# Patient Record
Sex: MALE | Race: WHITE | NOT HISPANIC OR LATINO
[De-identification: names, ages, dates, MRNs, and addresses within clinical notes are randomized per-mention and may not be internally consistent; named-entity substitution may affect disease eponyms.]

---

## 2018-03-27 PROBLEM — Z00.00 ENCOUNTER FOR PREVENTIVE HEALTH EXAMINATION: Status: ACTIVE | Noted: 2018-03-27

## 2018-04-02 ENCOUNTER — APPOINTMENT (OUTPATIENT)
Dept: CARDIOLOGY | Facility: CLINIC | Age: 58
End: 2018-04-02
Payer: COMMERCIAL

## 2018-04-02 ENCOUNTER — NON-APPOINTMENT (OUTPATIENT)
Age: 58
End: 2018-04-02

## 2018-04-02 VITALS
BODY MASS INDEX: 29.33 KG/M2 | OXYGEN SATURATION: 98 % | HEART RATE: 76 BPM | WEIGHT: 198 LBS | DIASTOLIC BLOOD PRESSURE: 80 MMHG | HEIGHT: 69 IN | SYSTOLIC BLOOD PRESSURE: 122 MMHG

## 2018-04-02 DIAGNOSIS — Z78.9 OTHER SPECIFIED HEALTH STATUS: ICD-10-CM

## 2018-04-02 DIAGNOSIS — Z86.39 PERSONAL HISTORY OF OTHER ENDOCRINE, NUTRITIONAL AND METABOLIC DISEASE: ICD-10-CM

## 2018-04-02 PROCEDURE — 99204 OFFICE O/P NEW MOD 45 MIN: CPT

## 2018-04-02 PROCEDURE — 93000 ELECTROCARDIOGRAM COMPLETE: CPT

## 2018-04-02 RX ORDER — MULTIVITAMIN
TABLET ORAL DAILY
Refills: 0 | Status: ACTIVE | COMMUNITY

## 2018-04-02 RX ORDER — ASPIRIN 81 MG/1
81 TABLET, CHEWABLE ORAL DAILY
Refills: 0 | Status: ACTIVE | COMMUNITY

## 2018-04-02 RX ORDER — ROSUVASTATIN CALCIUM 10 MG/1
10 TABLET, FILM COATED ORAL
Refills: 0 | Status: ACTIVE | COMMUNITY

## 2018-05-02 ENCOUNTER — APPOINTMENT (OUTPATIENT)
Dept: CARDIOLOGY | Facility: CLINIC | Age: 58
End: 2018-05-02
Payer: COMMERCIAL

## 2018-05-02 PROCEDURE — A9502: CPT

## 2018-05-02 PROCEDURE — 93015 CV STRESS TEST SUPVJ I&R: CPT

## 2018-05-02 PROCEDURE — 78452 HT MUSCLE IMAGE SPECT MULT: CPT

## 2018-05-02 PROCEDURE — 93306 TTE W/DOPPLER COMPLETE: CPT

## 2018-05-11 ENCOUNTER — APPOINTMENT (OUTPATIENT)
Dept: CARDIOLOGY | Facility: CLINIC | Age: 58
End: 2018-05-11
Payer: COMMERCIAL

## 2018-05-11 VITALS
HEART RATE: 68 BPM | WEIGHT: 203 LBS | BODY MASS INDEX: 30.07 KG/M2 | SYSTOLIC BLOOD PRESSURE: 118 MMHG | HEIGHT: 69 IN | DIASTOLIC BLOOD PRESSURE: 64 MMHG

## 2018-05-11 PROCEDURE — 99214 OFFICE O/P EST MOD 30 MIN: CPT

## 2019-05-03 ENCOUNTER — RECORD ABSTRACTING (OUTPATIENT)
Age: 59
End: 2019-05-03

## 2019-05-30 ENCOUNTER — RECORD ABSTRACTING (OUTPATIENT)
Age: 59
End: 2019-05-30

## 2019-05-30 ENCOUNTER — NON-APPOINTMENT (OUTPATIENT)
Age: 59
End: 2019-05-30

## 2019-05-30 ENCOUNTER — APPOINTMENT (OUTPATIENT)
Dept: CARDIOLOGY | Facility: CLINIC | Age: 59
End: 2019-05-30
Payer: COMMERCIAL

## 2019-05-30 VITALS
SYSTOLIC BLOOD PRESSURE: 120 MMHG | DIASTOLIC BLOOD PRESSURE: 70 MMHG | HEART RATE: 53 BPM | OXYGEN SATURATION: 98 % | HEIGHT: 69 IN | BODY MASS INDEX: 28.73 KG/M2 | WEIGHT: 194 LBS

## 2019-05-30 DIAGNOSIS — I25.10 ATHEROSCLEROTIC HEART DISEASE OF NATIVE CORONARY ARTERY W/OUT ANGINA PECTORIS: ICD-10-CM

## 2019-05-30 DIAGNOSIS — Z86.39 PERSONAL HISTORY OF OTHER ENDOCRINE, NUTRITIONAL AND METABOLIC DISEASE: ICD-10-CM

## 2019-05-30 PROCEDURE — 93000 ELECTROCARDIOGRAM COMPLETE: CPT

## 2019-05-30 PROCEDURE — 99214 OFFICE O/P EST MOD 30 MIN: CPT

## 2019-05-30 NOTE — HISTORY OF PRESENT ILLNESS
[FreeTextEntry1] : Patient is presenting here today for cardiac follow up. Patient has a history of family history of CAD. Patient has a history of hyperlipidemia. Patient referred for calcium score. The CAT scan showed high calcium score suggestive of obstructive CAD. Patient is physically active. Patient exercises every day. Patient denies any chest pains or shortness of breath. No palpitations or syncope.

## 2019-05-30 NOTE — ASSESSMENT
[FreeTextEntry1] : High calcium score. Patient has a history of hyperlipidemia and family history of CAD.\par Patient had a nuclear stress test and echocardiogram done last year which revealed no evidence of any ischemia. There is no evidence of any structural heart disease. The patient is physically very active. He presents 3 times a week without any symptoms. Patient is on statin and aspirin. Continue primary prevention. Cardiac followup yearly and as needed.

## 2019-05-30 NOTE — PHYSICAL EXAM
[General Appearance - Well Developed] : well developed [Normal Appearance] : normal appearance [Well Groomed] : well groomed [General Appearance - Well Nourished] : well nourished [No Deformities] : no deformities [General Appearance - In No Acute Distress] : no acute distress [Normal Conjunctiva] : the conjunctiva exhibited no abnormalities [Eyelids - No Xanthelasma] : the eyelids demonstrated no xanthelasmas [Normal Oral Mucosa] : normal oral mucosa [No Oral Pallor] : no oral pallor [No Oral Cyanosis] : no oral cyanosis [Normal Jugular Venous A Waves Present] : normal jugular venous A waves present [Normal Jugular Venous V Waves Present] : normal jugular venous V waves present [No Jugular Venous Alexander A Waves] : no jugular venous alexander A waves [Respiration, Rhythm And Depth] : normal respiratory rhythm and effort [Exaggerated Use Of Accessory Muscles For Inspiration] : no accessory muscle use [Auscultation Breath Sounds / Voice Sounds] : lungs were clear to auscultation bilaterally [Heart Rate And Rhythm] : heart rate and rhythm were normal [Heart Sounds] : normal S1 and S2 [Abdomen Soft] : soft [Abdomen Tenderness] : non-tender [Abdomen Mass (___ Cm)] : no abdominal mass palpated [Abnormal Walk] : normal gait [Gait - Sufficient For Exercise Testing] : the gait was sufficient for exercise testing [Nail Clubbing] : no clubbing of the fingernails [Cyanosis, Localized] : no localized cyanosis [Petechial Hemorrhages (___cm)] : no petechial hemorrhages [Skin Color & Pigmentation] : normal skin color and pigmentation [] : no rash [No Venous Stasis] : no venous stasis [Skin Lesions] : no skin lesions [No Skin Ulcers] : no skin ulcer [No Xanthoma] : no  xanthoma was observed [Oriented To Time, Place, And Person] : oriented to person, place, and time [Affect] : the affect was normal [Mood] : the mood was normal [No Anxiety] : not feeling anxious [FreeTextEntry1] : SM

## 2020-05-21 ENCOUNTER — APPOINTMENT (OUTPATIENT)
Dept: CARDIOLOGY | Facility: CLINIC | Age: 60
End: 2020-05-21

## 2021-04-04 ENCOUNTER — APPOINTMENT (OUTPATIENT)
Dept: DISASTER EMERGENCY | Facility: CLINIC | Age: 61
End: 2021-04-04

## 2021-04-04 DIAGNOSIS — Z01.818 ENCOUNTER FOR OTHER PREPROCEDURAL EXAMINATION: ICD-10-CM

## 2021-04-05 LAB — SARS-COV-2 N GENE NPH QL NAA+PROBE: NOT DETECTED

## 2022-03-21 ENCOUNTER — APPOINTMENT (OUTPATIENT)
Dept: ULTRASOUND IMAGING | Facility: CLINIC | Age: 62
End: 2022-03-21
Payer: COMMERCIAL

## 2022-03-21 PROCEDURE — 76700 US EXAM ABDOM COMPLETE: CPT

## 2023-01-16 ENCOUNTER — OFFICE (OUTPATIENT)
Dept: URBAN - METROPOLITAN AREA CLINIC 103 | Facility: CLINIC | Age: 63
Setting detail: OPHTHALMOLOGY
End: 2023-01-16
Payer: MEDICARE

## 2023-01-16 DIAGNOSIS — H40.041: ICD-10-CM

## 2023-01-16 DIAGNOSIS — H40.1131: ICD-10-CM

## 2023-01-16 DIAGNOSIS — H26.493: ICD-10-CM

## 2023-01-16 PROBLEM — H26.491 POSTERIOR CAPSULE OPACITY; RIGHT EYE, LEFT EYE, BOTH EYES: Status: ACTIVE | Noted: 2023-01-16

## 2023-01-16 PROBLEM — H26.492 POSTERIOR CAPSULE OPACITY; RIGHT EYE, LEFT EYE, BOTH EYES: Status: ACTIVE | Noted: 2023-01-16

## 2023-01-16 PROCEDURE — 99214 OFFICE O/P EST MOD 30 MIN: CPT | Performed by: OPHTHALMOLOGY

## 2023-01-16 PROCEDURE — 92250 FUNDUS PHOTOGRAPHY W/I&R: CPT | Performed by: OPHTHALMOLOGY

## 2023-01-16 ASSESSMENT — KERATOMETRY
OS_K1POWER_DIOPTERS: 44.00
OD_K1POWER_DIOPTERS: 44.50
METHOD_AUTO_MANUAL: AUTO
OD_K2POWER_DIOPTERS: 44.50
OS_K2POWER_DIOPTERS: 45.00
OS_AXISANGLE_DEGREES: 044

## 2023-01-16 ASSESSMENT — REFRACTION_MANIFEST
OS_VA1: 20/60
OD_AXIS: 085
OD_SPHERE: +1.00
OS_VA2: 20/20(J1+)
OD_VA1: 20/40
OD_CYLINDER: -0.50
OD_VA1: 20/40
OS_CYLINDER: -1.75
OS_VA1: 20/30+2
OD_SPHERE: -6.25
OS_SPHERE: PLANO
OD_ADD: +2.50
OS_ADD: +2.50
OD_CYLINDER: -0.75
OS_SPHERE: -1.00
OD_VA2: 20/20(J1+)
OS_AXIS: 120
OD_AXIS: 075
OS_AXIS: 122
OS_CYLINDER: -1.00

## 2023-01-16 ASSESSMENT — REFRACTION_AUTOREFRACTION
OD_CYLINDER: -0.50
OS_AXIS: 122
OS_SPHERE: -1.00
OD_AXIS: 085
OS_CYLINDER: -1.00
OD_SPHERE: +1.00

## 2023-01-16 ASSESSMENT — PACHYMETRY
OD_CT_CORRECTION: 2
OS_CT_CORRECTION: 1
OS_CT_UM: 526
OD_CT_UM: 517

## 2023-01-16 ASSESSMENT — AXIALLENGTH_DERIVED
OS_AL: 23.811
OD_AL: 22.9503
OD_AL: 26.035
OD_AL: 22.9503
OS_AL: 23.811

## 2023-01-16 ASSESSMENT — SPHEQUIV_DERIVED
OD_SPHEQUIV: 0.75
OD_SPHEQUIV: -6.625
OD_SPHEQUIV: 0.75
OS_SPHEQUIV: -1.5
OS_SPHEQUIV: -1.5

## 2023-01-16 ASSESSMENT — REFRACTION_CURRENTRX
OD_OVR_VA: 20/
OD_SPHERE: +2.50
OS_SPHERE: +2.50
OS_OVR_VA: 20/

## 2023-01-16 ASSESSMENT — CONFRONTATIONAL VISUAL FIELD TEST (CVF)
OS_FINDINGS: FULL
OD_FINDINGS: FULL

## 2023-01-16 ASSESSMENT — VISUAL ACUITY
OD_BCVA: 20/60
OS_BCVA: 20/40

## 2023-01-16 ASSESSMENT — TONOMETRY
OS_IOP_MMHG: 18
OD_IOP_MMHG: 14

## 2023-03-15 ENCOUNTER — ASC (OUTPATIENT)
Dept: URBAN - METROPOLITAN AREA SURGERY 8 | Facility: SURGERY | Age: 63
Setting detail: OPHTHALMOLOGY
End: 2023-03-15
Payer: MEDICARE

## 2023-03-15 DIAGNOSIS — H26.492: ICD-10-CM

## 2023-03-15 PROCEDURE — 66821 AFTER CATARACT LASER SURGERY: CPT | Performed by: OPHTHALMOLOGY

## 2023-03-15 ASSESSMENT — SPHEQUIV_DERIVED
OD_SPHEQUIV: 0.75
OS_SPHEQUIV: -1.5
OS_SPHEQUIV: -1.5
OD_SPHEQUIV: 0.75
OD_SPHEQUIV: -6.625

## 2023-03-15 ASSESSMENT — REFRACTION_CURRENTRX
OD_OVR_VA: 20/
OS_OVR_VA: 20/
OS_SPHERE: +2.50
OD_SPHERE: +2.50

## 2023-03-15 ASSESSMENT — AXIALLENGTH_DERIVED
OD_AL: 22.9503
OD_AL: 22.9503
OS_AL: 23.811
OS_AL: 23.811
OD_AL: 26.035

## 2023-03-15 ASSESSMENT — REFRACTION_MANIFEST
OD_CYLINDER: -0.75
OS_SPHERE: -1.00
OS_VA1: 20/30+2
OS_CYLINDER: -1.00
OS_VA1: 20/60
OD_ADD: +2.50
OS_AXIS: 122
OD_VA1: 20/40
OD_CYLINDER: -0.50
OS_VA2: 20/20(J1+)
OD_VA1: 20/40
OD_AXIS: 075
OD_AXIS: 085
OS_CYLINDER: -1.75
OS_ADD: +2.50
OS_AXIS: 120
OD_SPHERE: -6.25
OD_VA2: 20/20(J1+)
OS_SPHERE: PLANO
OD_SPHERE: +1.00

## 2023-03-15 ASSESSMENT — REFRACTION_AUTOREFRACTION
OS_CYLINDER: -1.00
OS_AXIS: 122
OD_CYLINDER: -0.50
OS_SPHERE: -1.00
OD_AXIS: 085
OD_SPHERE: +1.00

## 2023-03-15 ASSESSMENT — KERATOMETRY
OS_AXISANGLE_DEGREES: 044
METHOD_AUTO_MANUAL: AUTO
OD_K2POWER_DIOPTERS: 44.50
OS_K2POWER_DIOPTERS: 45.00
OD_K1POWER_DIOPTERS: 44.50
OS_K1POWER_DIOPTERS: 44.00

## 2023-03-15 ASSESSMENT — VISUAL ACUITY
OS_BCVA: 20/40
OD_BCVA: 20/60

## 2023-03-17 ENCOUNTER — ASC (OUTPATIENT)
Dept: URBAN - METROPOLITAN AREA SURGERY 8 | Facility: SURGERY | Age: 63
Setting detail: OPHTHALMOLOGY
End: 2023-03-17
Payer: MEDICARE

## 2023-03-17 DIAGNOSIS — H26.491: ICD-10-CM

## 2023-03-17 PROBLEM — H26.492 POSTERIOR CAPSULE OPACITY; RIGHT EYE, LEFT EYE: Status: ACTIVE | Noted: 2023-03-17

## 2023-03-17 PROCEDURE — 66821 AFTER CATARACT LASER SURGERY: CPT | Performed by: OPHTHALMOLOGY

## 2023-03-17 ASSESSMENT — REFRACTION_AUTOREFRACTION
OS_AXIS: 122
OD_SPHERE: +1.00
OD_CYLINDER: -0.50
OS_CYLINDER: -1.00
OS_SPHERE: -1.00
OD_AXIS: 085

## 2023-03-17 ASSESSMENT — AXIALLENGTH_DERIVED
OD_AL: 26.035
OD_AL: 22.9503
OS_AL: 23.811
OS_AL: 23.811
OD_AL: 22.9503

## 2023-03-17 ASSESSMENT — SPHEQUIV_DERIVED
OS_SPHEQUIV: -1.5
OD_SPHEQUIV: -6.625
OD_SPHEQUIV: 0.75
OD_SPHEQUIV: 0.75
OS_SPHEQUIV: -1.5

## 2023-03-17 ASSESSMENT — REFRACTION_MANIFEST
OD_SPHERE: -6.25
OS_AXIS: 122
OS_VA1: 20/60
OD_SPHERE: +1.00
OS_CYLINDER: -1.00
OS_SPHERE: -1.00
OD_VA1: 20/40
OS_VA1: 20/30+2
OD_VA2: 20/20(J1+)
OD_AXIS: 085
OS_VA2: 20/20(J1+)
OD_CYLINDER: -0.50
OS_SPHERE: PLANO
OD_CYLINDER: -0.75
OS_CYLINDER: -1.75
OD_ADD: +2.50
OS_ADD: +2.50
OS_AXIS: 120
OD_AXIS: 075
OD_VA1: 20/40

## 2023-03-17 ASSESSMENT — REFRACTION_CURRENTRX
OS_OVR_VA: 20/
OD_SPHERE: +2.50
OS_SPHERE: +2.50
OD_OVR_VA: 20/

## 2023-03-17 ASSESSMENT — KERATOMETRY
OD_K1POWER_DIOPTERS: 44.50
OS_K2POWER_DIOPTERS: 45.00
OS_K1POWER_DIOPTERS: 44.00
OS_AXISANGLE_DEGREES: 044
OD_K2POWER_DIOPTERS: 44.50
METHOD_AUTO_MANUAL: AUTO

## 2023-03-17 ASSESSMENT — VISUAL ACUITY
OD_BCVA: 20/60
OS_BCVA: 20/40

## 2023-04-24 ENCOUNTER — OFFICE (OUTPATIENT)
Dept: URBAN - METROPOLITAN AREA CLINIC 103 | Facility: CLINIC | Age: 63
Setting detail: OPHTHALMOLOGY
End: 2023-04-24
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
## 2023-04-24 PROCEDURE — NO SHOW FE NO SHOW FEE: Performed by: OPHTHALMOLOGY

## 2023-05-11 ENCOUNTER — RX ONLY (RX ONLY)
Age: 63
End: 2023-05-11

## 2023-05-11 ENCOUNTER — OFFICE (OUTPATIENT)
Dept: URBAN - METROPOLITAN AREA CLINIC 113 | Facility: CLINIC | Age: 63
Setting detail: OPHTHALMOLOGY
End: 2023-05-11
Payer: MEDICARE

## 2023-05-11 DIAGNOSIS — H40.1131: ICD-10-CM

## 2023-05-11 DIAGNOSIS — H40.041: ICD-10-CM

## 2023-05-11 DIAGNOSIS — Z96.1: ICD-10-CM

## 2023-05-11 PROCEDURE — 99024 POSTOP FOLLOW-UP VISIT: CPT | Performed by: OPHTHALMOLOGY

## 2023-05-11 ASSESSMENT — AXIALLENGTH_DERIVED
OS_AL: 23.7643
OD_AL: 26.091
OD_AL: 22.9939
OS_AL: 23.0455
OD_AL: 23.0868

## 2023-05-11 ASSESSMENT — REFRACTION_AUTOREFRACTION
OD_AXIS: 079
OS_SPHERE: +1.00
OS_CYLINDER: -1.25
OD_SPHERE: +0.75
OS_AXIS: 120
OD_CYLINDER: -0.50

## 2023-05-11 ASSESSMENT — REFRACTION_MANIFEST
OD_SPHERE: +1.00
OD_SPHERE: -6.25
OS_ADD: +2.50
OD_CYLINDER: -0.75
OS_CYLINDER: -1.75
OS_VA1: 20/30+2
OD_CYLINDER: -0.50
OD_AXIS: 075
OS_VA2: 20/20(J1+)
OS_VA1: 20/60
OS_AXIS: 122
OS_CYLINDER: -1.00
OS_SPHERE: PLANO
OD_VA2: 20/20(J1+)
OD_VA1: 20/40
OD_AXIS: 085
OD_ADD: +2.50
OD_VA1: 20/40
OS_SPHERE: -1.00
OS_AXIS: 120

## 2023-05-11 ASSESSMENT — REFRACTION_CURRENTRX
OD_OVR_VA: 20/
OD_SPHERE: +2.50
OS_OVR_VA: 20/
OS_SPHERE: +2.50

## 2023-05-11 ASSESSMENT — PACHYMETRY
OD_CT_UM: 517
OS_CT_CORRECTION: 1
OD_CT_CORRECTION: 2
OS_CT_UM: 526

## 2023-05-11 ASSESSMENT — SPHEQUIV_DERIVED
OS_SPHEQUIV: -1.5
OD_SPHEQUIV: 0.5
OD_SPHEQUIV: 0.75
OD_SPHEQUIV: -6.625
OS_SPHEQUIV: 0.375

## 2023-05-11 ASSESSMENT — VISUAL ACUITY
OD_BCVA: 20/30
OS_BCVA: 20/30+1

## 2023-05-11 ASSESSMENT — KERATOMETRY
METHOD_AUTO_MANUAL: AUTO
OS_K1POWER_DIOPTERS: 44.25
OD_K2POWER_DIOPTERS: 44.50
OS_AXISANGLE_DEGREES: 037
OD_K1POWER_DIOPTERS: 44.25
OD_AXISANGLE_DEGREES: 133
OS_K2POWER_DIOPTERS: 45.00

## 2023-05-11 ASSESSMENT — CONFRONTATIONAL VISUAL FIELD TEST (CVF)
OD_FINDINGS: FULL
OS_FINDINGS: FULL

## 2023-05-11 ASSESSMENT — TONOMETRY: OD_IOP_MMHG: 20

## 2023-06-08 ENCOUNTER — OFFICE (OUTPATIENT)
Dept: URBAN - METROPOLITAN AREA CLINIC 113 | Facility: CLINIC | Age: 63
Setting detail: OPHTHALMOLOGY
End: 2023-06-08
Payer: MEDICARE

## 2023-06-08 DIAGNOSIS — H40.1131: ICD-10-CM

## 2023-06-08 DIAGNOSIS — H40.041: ICD-10-CM

## 2023-06-08 DIAGNOSIS — Z96.1: ICD-10-CM

## 2023-06-08 PROCEDURE — 92012 INTRM OPH EXAM EST PATIENT: CPT | Performed by: OPHTHALMOLOGY

## 2023-06-08 ASSESSMENT — REFRACTION_MANIFEST
OD_AXIS: 075
OD_VA2: 20/20(J1+)
OS_ADD: +2.50
OS_AXIS: 122
OS_SPHERE: PLANO
OD_CYLINDER: -0.75
OD_CYLINDER: -0.50
OS_CYLINDER: -1.00
OS_AXIS: 120
OS_VA1: 20/30+2
OS_CYLINDER: -1.75
OD_SPHERE: -6.25
OS_VA1: 20/60
OD_VA1: 20/40
OD_ADD: +2.50
OD_VA1: 20/40
OS_SPHERE: -1.00
OD_AXIS: 085
OD_SPHERE: +1.00
OS_VA2: 20/20(J1+)

## 2023-06-08 ASSESSMENT — TONOMETRY
OS_IOP_MMHG: 18
OD_IOP_MMHG: 11

## 2023-06-08 ASSESSMENT — AXIALLENGTH_DERIVED
OD_AL: 23.0894
OD_AL: 22.9503
OS_AL: 35.13
OD_AL: 26.035
OS_AL: 35.02

## 2023-06-08 ASSESSMENT — VISUAL ACUITY
OS_BCVA: 20/30+1
OD_BCVA: 20/30

## 2023-06-08 ASSESSMENT — PACHYMETRY
OD_CT_UM: 517
OD_CT_CORRECTION: 2
OS_CT_UM: 526
OS_CT_CORRECTION: 1

## 2023-06-08 ASSESSMENT — REFRACTION_AUTOREFRACTION
OS_SPHERE: -1.00
OD_CYLINDER: -0.25
OD_AXIS: 060
OS_AXIS: 121
OD_SPHERE: +0.50
OS_CYLINDER: -1.25

## 2023-06-08 ASSESSMENT — KERATOMETRY
OS_AXISANGLE_DEGREES: 036
OS_K2POWER_DIOPTERS: 4.00
OD_AXISANGLE_DEGREES: 090
OD_K2POWER_DIOPTERS: 44.50
METHOD_AUTO_MANUAL: AUTO
OD_K1POWER_DIOPTERS: 44.50
OS_K1POWER_DIOPTERS: 44.25

## 2023-06-08 ASSESSMENT — CONFRONTATIONAL VISUAL FIELD TEST (CVF)
OD_FINDINGS: FULL
OS_FINDINGS: FULL

## 2023-06-08 ASSESSMENT — SPHEQUIV_DERIVED
OD_SPHEQUIV: 0.75
OD_SPHEQUIV: 0.375
OD_SPHEQUIV: -6.625
OS_SPHEQUIV: -1.5
OS_SPHEQUIV: -1.625

## 2023-06-08 ASSESSMENT — REFRACTION_CURRENTRX
OS_SPHERE: +2.50
OD_OVR_VA: 20/
OD_SPHERE: +2.50
OS_OVR_VA: 20/

## 2023-07-13 ENCOUNTER — OFFICE (OUTPATIENT)
Dept: URBAN - METROPOLITAN AREA CLINIC 113 | Facility: CLINIC | Age: 63
Setting detail: OPHTHALMOLOGY
End: 2023-07-13
Payer: MEDICARE

## 2023-07-13 DIAGNOSIS — H40.1131: ICD-10-CM

## 2023-07-13 DIAGNOSIS — H40.041: ICD-10-CM

## 2023-07-13 DIAGNOSIS — Z96.1: ICD-10-CM

## 2023-07-13 PROCEDURE — 99213 OFFICE O/P EST LOW 20 MIN: CPT | Performed by: OPHTHALMOLOGY

## 2023-07-13 ASSESSMENT — KERATOMETRY
OS_K2POWER_DIOPTERS: 45.00
OS_K1POWER_DIOPTERS: 44.25
OS_AXISANGLE_DEGREES: 039
METHOD_AUTO_MANUAL: AUTO
OD_K1POWER_DIOPTERS: 44.25
OD_AXISANGLE_DEGREES: 090
OD_K2POWER_DIOPTERS: 44.25

## 2023-07-13 ASSESSMENT — CONFRONTATIONAL VISUAL FIELD TEST (CVF)
OS_FINDINGS: FULL
OD_FINDINGS: FULL

## 2023-07-13 ASSESSMENT — REFRACTION_MANIFEST
OS_SPHERE: PLANO
OS_AXIS: 120
OS_ADD: +2.50
OD_SPHERE: +1.00
OS_CYLINDER: -1.75
OD_VA1: 20/40
OS_VA2: 20/20(J1+)
OD_AXIS: 075
OD_AXIS: 085
OS_SPHERE: -1.00
OD_ADD: +2.50
OS_VA1: 20/60
OS_VA1: 20/30+2
OD_CYLINDER: -0.75
OS_CYLINDER: -1.00
OD_VA1: 20/40
OD_VA2: 20/20(J1+)
OS_AXIS: 122
OD_CYLINDER: -0.50
OD_SPHERE: -6.25

## 2023-07-13 ASSESSMENT — REFRACTION_CURRENTRX
OS_OVR_VA: 20/
OD_OVR_VA: 20/
OS_SPHERE: +2.50
OD_SPHERE: +2.50

## 2023-07-13 ASSESSMENT — AXIALLENGTH_DERIVED
OD_AL: 26.1473
OS_AL: 23.7643
OD_AL: 23.0841
OS_AL: 23.8139
OD_AL: 23.0376

## 2023-07-13 ASSESSMENT — SPHEQUIV_DERIVED
OD_SPHEQUIV: 0.75
OS_SPHEQUIV: -1.625
OS_SPHEQUIV: -1.5
OD_SPHEQUIV: 0.625
OD_SPHEQUIV: -6.625

## 2023-07-13 ASSESSMENT — REFRACTION_AUTOREFRACTION
OS_AXIS: 115
OD_SPHERE: +0.75
OD_CYLINDER: -0.25
OD_AXIS: 082
OS_CYLINDER: -1.25
OS_SPHERE: -1.00

## 2023-07-13 ASSESSMENT — PACHYMETRY
OD_CT_UM: 517
OD_CT_CORRECTION: 2
OS_CT_CORRECTION: 1
OS_CT_UM: 526

## 2023-07-13 ASSESSMENT — VISUAL ACUITY
OD_BCVA: 20/30-1
OS_BCVA: 20/20-1

## 2023-07-13 ASSESSMENT — TONOMETRY
OS_IOP_MMHG: 15
OS_IOP_MMHG: 15
OD_IOP_MMHG: 15

## 2023-11-27 ENCOUNTER — OFFICE (OUTPATIENT)
Dept: URBAN - METROPOLITAN AREA CLINIC 103 | Facility: CLINIC | Age: 63
Setting detail: OPHTHALMOLOGY
End: 2023-11-27
Payer: MEDICARE

## 2023-11-27 DIAGNOSIS — H40.1131: ICD-10-CM

## 2023-11-27 PROCEDURE — 92020 GONIOSCOPY: CPT | Performed by: OPHTHALMOLOGY

## 2023-11-27 PROCEDURE — 92014 COMPRE OPH EXAM EST PT 1/>: CPT | Performed by: OPHTHALMOLOGY

## 2023-11-27 PROCEDURE — 92083 EXTENDED VISUAL FIELD XM: CPT | Performed by: OPHTHALMOLOGY

## 2023-11-27 PROCEDURE — 92133 CPTRZD OPH DX IMG PST SGM ON: CPT | Performed by: OPHTHALMOLOGY

## 2023-11-27 ASSESSMENT — REFRACTION_MANIFEST
OS_SPHERE: -1.00
OS_ADD: +2.50
OS_AXIS: 120
OD_AXIS: 075
OD_CYLINDER: -0.75
OS_VA1: 20/30+2
OD_VA1: 20/40
OS_CYLINDER: -1.00
OS_CYLINDER: -1.75
OS_AXIS: 122
OD_CYLINDER: -0.50
OS_VA1: 20/60
OD_VA1: 20/40
OS_SPHERE: PLANO
OD_SPHERE: -6.25
OD_ADD: +2.50
OS_VA2: 20/20(J1+)
OD_AXIS: 085
OD_SPHERE: +1.00
OD_VA2: 20/20(J1+)

## 2023-11-27 ASSESSMENT — REFRACTION_CURRENTRX
OS_SPHERE: +2.50
OD_OVR_VA: 20/
OD_SPHERE: +2.50
OS_OVR_VA: 20/

## 2023-11-27 ASSESSMENT — REFRACTION_AUTOREFRACTION
OS_SPHERE: -0.25
OD_SPHERE: +1.00
OD_CYLINDER: -0.75
OD_AXIS: 097
OS_AXIS: 117
OS_CYLINDER: -1.50

## 2023-11-27 ASSESSMENT — SPHEQUIV_DERIVED
OD_SPHEQUIV: 0.625
OD_SPHEQUIV: 0.75
OS_SPHEQUIV: -1.5
OD_SPHEQUIV: -6.625
OS_SPHEQUIV: -1

## 2023-11-27 ASSESSMENT — CONFRONTATIONAL VISUAL FIELD TEST (CVF)
OD_FINDINGS: FULL
OS_FINDINGS: FULL

## 2024-03-11 ENCOUNTER — OFFICE (OUTPATIENT)
Dept: URBAN - METROPOLITAN AREA CLINIC 103 | Facility: CLINIC | Age: 64
Setting detail: OPHTHALMOLOGY
End: 2024-03-11
Payer: MEDICARE

## 2024-03-11 ENCOUNTER — RX ONLY (RX ONLY)
Age: 64
End: 2024-03-11

## 2024-03-11 DIAGNOSIS — H40.1131: ICD-10-CM

## 2024-03-11 PROCEDURE — 92250 FUNDUS PHOTOGRAPHY W/I&R: CPT | Performed by: OPHTHALMOLOGY

## 2024-03-11 PROCEDURE — 92014 COMPRE OPH EXAM EST PT 1/>: CPT | Performed by: OPHTHALMOLOGY

## 2024-03-11 ASSESSMENT — REFRACTION_MANIFEST
OD_AXIS: 085
OD_CYLINDER: -0.75
OS_AXIS: 120
OS_VA1: 20/30+2
OS_CYLINDER: -1.75
OD_VA1: 20/40
OD_CYLINDER: -0.50
OD_ADD: +2.50
OD_SPHERE: -6.25
OS_SPHERE: -1.00
OD_VA1: 20/40
OS_AXIS: 122
OS_ADD: +2.50
OS_VA1: 20/60
OS_VA2: 20/20(J1+)
OD_VA2: 20/20(J1+)
OD_SPHERE: +1.00
OS_CYLINDER: -1.00
OS_SPHERE: PLANO
OD_AXIS: 075

## 2024-03-11 ASSESSMENT — REFRACTION_CURRENTRX
OD_SPHERE: +2.50
OS_OVR_VA: 20/
OS_SPHERE: +2.50
OD_OVR_VA: 20/

## 2024-03-11 ASSESSMENT — SPHEQUIV_DERIVED
OD_SPHEQUIV: 0.75
OD_SPHEQUIV: -6.625
OS_SPHEQUIV: -1.5

## 2024-04-08 ENCOUNTER — OFFICE (OUTPATIENT)
Dept: URBAN - METROPOLITAN AREA CLINIC 103 | Facility: CLINIC | Age: 64
Setting detail: OPHTHALMOLOGY
End: 2024-04-08
Payer: COMMERCIAL

## 2024-04-08 DIAGNOSIS — H40.1131: ICD-10-CM

## 2024-04-08 PROCEDURE — 99213 OFFICE O/P EST LOW 20 MIN: CPT | Performed by: OPHTHALMOLOGY

## 2024-07-22 ENCOUNTER — OFFICE (OUTPATIENT)
Dept: URBAN - METROPOLITAN AREA CLINIC 103 | Facility: CLINIC | Age: 64
Setting detail: OPHTHALMOLOGY
End: 2024-07-22
Payer: COMMERCIAL

## 2024-07-22 DIAGNOSIS — H40.041: ICD-10-CM

## 2024-07-22 DIAGNOSIS — Z96.1: ICD-10-CM

## 2024-07-22 DIAGNOSIS — H40.1131: ICD-10-CM

## 2024-07-22 PROCEDURE — 99213 OFFICE O/P EST LOW 20 MIN: CPT | Performed by: OPHTHALMOLOGY

## 2024-07-22 ASSESSMENT — CONFRONTATIONAL VISUAL FIELD TEST (CVF)
OD_FINDINGS: FULL
OS_FINDINGS: FULL

## 2024-12-16 ENCOUNTER — OFFICE (OUTPATIENT)
Dept: URBAN - METROPOLITAN AREA CLINIC 112 | Facility: CLINIC | Age: 64
Setting detail: OPHTHALMOLOGY
End: 2024-12-16
Payer: COMMERCIAL

## 2024-12-16 DIAGNOSIS — H40.041: ICD-10-CM

## 2024-12-16 DIAGNOSIS — H40.1131: ICD-10-CM

## 2024-12-16 DIAGNOSIS — Z96.1: ICD-10-CM

## 2024-12-16 PROCEDURE — 92012 INTRM OPH EXAM EST PATIENT: CPT | Performed by: OPHTHALMOLOGY

## 2024-12-16 PROCEDURE — 92133 CPTRZD OPH DX IMG PST SGM ON: CPT | Performed by: OPHTHALMOLOGY

## 2024-12-16 PROCEDURE — 92083 EXTENDED VISUAL FIELD XM: CPT | Performed by: OPHTHALMOLOGY

## 2024-12-16 ASSESSMENT — REFRACTION_MANIFEST
OD_VA1: 20/40
OS_SPHERE: -1.00
OS_VA2: 20/20(J1+)
OD_VA1: 20/40
OS_VA1: 20/60
OD_CYLINDER: -0.50
OD_AXIS: 085
OS_ADD: +2.50
OS_CYLINDER: -1.75
OS_SPHERE: PLANO
OD_SPHERE: -6.25
OD_VA2: 20/20(J1+)
OS_VA1: 20/30+2
OS_CYLINDER: -1.00
OD_AXIS: 075
OS_AXIS: 122
OD_ADD: +2.50
OS_AXIS: 120
OD_SPHERE: +1.00
OD_CYLINDER: -0.75

## 2024-12-16 ASSESSMENT — KERATOMETRY
OS_AXISANGLE_DEGREES: 040
OS_K2POWER_DIOPTERS: 45.25
OD_K2POWER_DIOPTERS: 44.50
OD_K1POWER_DIOPTERS: 44.00
OD_AXISANGLE_DEGREES: 147
METHOD_AUTO_MANUAL: AUTO
OS_K1POWER_DIOPTERS: 44.00

## 2024-12-16 ASSESSMENT — TONOMETRY
OD_IOP_MMHG: 12
OS_IOP_MMHG: 12

## 2024-12-16 ASSESSMENT — REFRACTION_AUTOREFRACTION
OS_SPHERE: -0.75
OS_CYLINDER: -1.25
OD_CYLINDER: -0.50
OS_AXIS: 123
OD_AXIS: 089
OD_SPHERE: +1.00

## 2024-12-16 ASSESSMENT — REFRACTION_CURRENTRX
OS_OVR_VA: 20/
OD_SPHERE: +2.50
OD_OVR_VA: 20/
OS_SPHERE: +2.50

## 2024-12-16 ASSESSMENT — PACHYMETRY
OD_CT_CORRECTION: 2
OS_CT_CORRECTION: 1
OS_CT_UM: 526
OD_CT_UM: 517

## 2024-12-16 ASSESSMENT — VISUAL ACUITY
OD_BCVA: 20/100
OS_BCVA: 20/30

## 2024-12-16 ASSESSMENT — CONFRONTATIONAL VISUAL FIELD TEST (CVF)
OD_FINDINGS: FULL
OS_FINDINGS: FULL

## 2025-03-10 ENCOUNTER — OFFICE (OUTPATIENT)
Dept: URBAN - METROPOLITAN AREA CLINIC 103 | Facility: CLINIC | Age: 65
Setting detail: OPHTHALMOLOGY
End: 2025-03-10
Payer: MEDICARE

## 2025-03-10 DIAGNOSIS — H40.1131: ICD-10-CM

## 2025-03-10 DIAGNOSIS — Z96.1: ICD-10-CM

## 2025-03-10 DIAGNOSIS — H40.041: ICD-10-CM

## 2025-03-10 PROCEDURE — 92012 INTRM OPH EXAM EST PATIENT: CPT | Performed by: OPHTHALMOLOGY

## 2025-03-10 PROCEDURE — 92250 FUNDUS PHOTOGRAPHY W/I&R: CPT | Performed by: OPHTHALMOLOGY

## 2025-03-10 ASSESSMENT — TONOMETRY
OS_IOP_MMHG: 10
OD_IOP_MMHG: 13

## 2025-03-10 ASSESSMENT — REFRACTION_MANIFEST
OS_CYLINDER: -1.75
OS_AXIS: 122
OD_CYLINDER: -0.50
OS_ADD: +2.50
OS_VA1: 20/30+2
OS_VA1: 20/60
OD_VA2: 20/20(J1+)
OD_VA1: 20/40
OS_SPHERE: -1.00
OD_AXIS: 085
OS_SPHERE: PLANO
OD_SPHERE: +1.00
OD_AXIS: 075
OD_CYLINDER: -0.75
OS_AXIS: 120
OS_CYLINDER: -1.00
OD_SPHERE: -6.25
OS_VA2: 20/20(J1+)
OD_ADD: +2.50
OD_VA1: 20/40

## 2025-03-10 ASSESSMENT — KERATOMETRY
OS_K2POWER_DIOPTERS: 45.00
OS_K1POWER_DIOPTERS: 44.00
METHOD_AUTO_MANUAL: AUTO
OS_AXISANGLE_DEGREES: 036
OD_K1POWER_DIOPTERS: 44.25
OD_AXISANGLE_DEGREES: 006
OD_K2POWER_DIOPTERS: 44.50

## 2025-03-10 ASSESSMENT — REFRACTION_AUTOREFRACTION
OD_SPHERE: +1.00
OS_CYLINDER: -1.25
OS_SPHERE: -0.50
OS_AXIS: 120
OD_AXIS: 097
OD_CYLINDER: -0.75

## 2025-03-10 ASSESSMENT — VISUAL ACUITY
OD_BCVA: 20/100+1
OS_BCVA: 20/30-1

## 2025-03-10 ASSESSMENT — REFRACTION_CURRENTRX
OD_SPHERE: +2.50
OS_SPHERE: +2.50
OD_OVR_VA: 20/
OS_OVR_VA: 20/

## 2025-03-10 ASSESSMENT — PACHYMETRY
OS_CT_UM: 526
OD_CT_CORRECTION: 2
OS_CT_CORRECTION: 1
OD_CT_UM: 517

## 2025-03-10 ASSESSMENT — CONFRONTATIONAL VISUAL FIELD TEST (CVF)
OD_FINDINGS: FULL
OS_FINDINGS: FULL

## 2025-07-14 ENCOUNTER — OFFICE (OUTPATIENT)
Dept: URBAN - METROPOLITAN AREA CLINIC 103 | Facility: CLINIC | Age: 65
Setting detail: OPHTHALMOLOGY
End: 2025-07-14
Payer: MEDICARE

## 2025-07-14 DIAGNOSIS — Z96.1: ICD-10-CM

## 2025-07-14 DIAGNOSIS — H40.1131: ICD-10-CM

## 2025-07-14 DIAGNOSIS — H40.041: ICD-10-CM

## 2025-07-14 PROCEDURE — 92012 INTRM OPH EXAM EST PATIENT: CPT | Performed by: OPHTHALMOLOGY

## 2025-07-14 ASSESSMENT — REFRACTION_MANIFEST
OS_SPHERE: PLANO
OS_VA1: 20/60
OD_CYLINDER: -0.75
OS_SPHERE: -1.00
OS_ADD: +2.50
OD_VA1: 20/40
OS_CYLINDER: -1.75
OS_VA2: 20/20(J1+)
OD_VA2: 20/20(J1+)
OD_ADD: +2.50
OD_CYLINDER: -0.50
OD_SPHERE: -6.25
OD_AXIS: 085
OS_AXIS: 120
OD_VA1: 20/40
OD_SPHERE: +1.00
OS_AXIS: 122
OS_VA1: 20/30+2
OS_CYLINDER: -1.00
OD_AXIS: 075

## 2025-07-14 ASSESSMENT — PACHYMETRY
OS_CT_CORRECTION: 1
OD_CT_CORRECTION: 2
OD_CT_UM: 517
OS_CT_UM: 526

## 2025-07-14 ASSESSMENT — REFRACTION_CURRENTRX
OD_OVR_VA: 20/
OD_SPHERE: +2.50
OS_SPHERE: +2.50
OS_OVR_VA: 20/

## 2025-07-14 ASSESSMENT — KERATOMETRY
OD_K2POWER_DIOPTERS: 44.25
OS_K1POWER_DIOPTERS: 44.00
METHOD_AUTO_MANUAL: AUTO
OD_AXISANGLE_DEGREES: 002
OD_K1POWER_DIOPTERS: 43.75
OS_K2POWER_DIOPTERS: 45.00
OS_AXISANGLE_DEGREES: 015

## 2025-07-14 ASSESSMENT — TONOMETRY
OD_IOP_MMHG: 13
OS_IOP_MMHG: 10

## 2025-07-14 ASSESSMENT — REFRACTION_AUTOREFRACTION
OS_SPHERE: -1.00
OD_CYLINDER: -0.75
OS_AXIS: 106
OD_SPHERE: +1.00
OS_CYLINDER: -0.75
OD_AXIS: 084

## 2025-07-14 ASSESSMENT — CONFRONTATIONAL VISUAL FIELD TEST (CVF)
OS_FINDINGS: FULL
OD_FINDINGS: FULL

## 2025-07-14 ASSESSMENT — VISUAL ACUITY
OS_BCVA: 20/25-1
OD_BCVA: 20/60

## 2025-07-21 ENCOUNTER — OFFICE (OUTPATIENT)
Dept: URBAN - METROPOLITAN AREA CLINIC 103 | Facility: CLINIC | Age: 65
Setting detail: OPHTHALMOLOGY
End: 2025-07-21
Payer: MEDICARE

## 2025-07-21 DIAGNOSIS — H40.1131: ICD-10-CM

## 2025-07-21 DIAGNOSIS — H52.7: ICD-10-CM

## 2025-07-21 DIAGNOSIS — H40.041: ICD-10-CM

## 2025-07-21 PROCEDURE — 92015 DETERMINE REFRACTIVE STATE: CPT

## 2025-07-21 PROCEDURE — 99213 OFFICE O/P EST LOW 20 MIN: CPT

## 2025-07-21 ASSESSMENT — TONOMETRY
OD_IOP_MMHG: 15
OS_IOP_MMHG: 19

## 2025-07-21 ASSESSMENT — PACHYMETRY
OD_CT_CORRECTION: 2
OD_CT_UM: 517
OS_CT_CORRECTION: 1
OS_CT_UM: 526

## 2025-07-21 ASSESSMENT — REFRACTION_MANIFEST
OS_VA1: 20/50-1
OS_SPHERE: -0.50
OD_CYLINDER: -0.50
OS_CYLINDER: -0.50
OS_AXIS: 122
OD_VA2: 20/20(J1+)
OD_AXIS: 090
OS_ADD: +2.50
OD_VA1: 20/20-1
OS_SPHERE: -1.00
OD_VA1: 20/40
OD_CYLINDER: -0.25
OS_VA2: 20/20(J1+)
OD_SPHERE: +1.00
OD_AXIS: 085
OS_VA1: 20/60
OD_ADD: +2.50
OS_AXIS: 090
OD_SPHERE: +1.00
OS_CYLINDER: -1.00

## 2025-07-21 ASSESSMENT — REFRACTION_AUTOREFRACTION
OS_AXIS: 095
OS_SPHERE: -0.75
OD_AXIS: 086
OS_CYLINDER: -0.75
OD_SPHERE: +1.00
OD_CYLINDER: -0.75

## 2025-07-21 ASSESSMENT — CONFRONTATIONAL VISUAL FIELD TEST (CVF)
OD_FINDINGS: FULL
OS_FINDINGS: FULL

## 2025-07-21 ASSESSMENT — REFRACTION_CURRENTRX
OD_OVR_VA: 20/
OS_SPHERE: +2.50
OS_OVR_VA: 20/
OD_SPHERE: +2.50

## 2025-07-21 ASSESSMENT — KERATOMETRY
OS_K1POWER_DIOPTERS: 44.25
OS_AXISANGLE_DEGREES: 011
OD_AXISANGLE_DEGREES: 004
OS_K2POWER_DIOPTERS: 45.00
OD_K2POWER_DIOPTERS: 44.25
OD_K1POWER_DIOPTERS: 43.75
METHOD_AUTO_MANUAL: AUTO

## 2025-07-21 ASSESSMENT — VISUAL ACUITY
OS_BCVA: 20/30-2
OD_BCVA: 20/80